# Patient Record
Sex: MALE | Race: WHITE
[De-identification: names, ages, dates, MRNs, and addresses within clinical notes are randomized per-mention and may not be internally consistent; named-entity substitution may affect disease eponyms.]

---

## 2018-01-11 ENCOUNTER — HOSPITAL ENCOUNTER (EMERGENCY)
Dept: HOSPITAL 74 - FER | Age: 78
Discharge: HOME | End: 2018-01-11
Payer: COMMERCIAL

## 2018-01-11 VITALS — BODY MASS INDEX: 25.4 KG/M2

## 2018-01-11 VITALS — DIASTOLIC BLOOD PRESSURE: 68 MMHG | TEMPERATURE: 98.2 F | SYSTOLIC BLOOD PRESSURE: 170 MMHG | HEART RATE: 73 BPM

## 2018-01-11 DIAGNOSIS — Z87.891: ICD-10-CM

## 2018-01-11 DIAGNOSIS — S00.83XA: Primary | ICD-10-CM

## 2018-01-11 DIAGNOSIS — Z85.51: ICD-10-CM
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
## 2018-01-11 DIAGNOSIS — I10: ICD-10-CM
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
## 2018-01-11 DIAGNOSIS — S00.81XA: ICD-10-CM

## 2018-01-11 PROCEDURE — 3E0234Z INTRODUCTION OF SERUM, TOXOID AND VACCINE INTO MUSCLE, PERCUTANEOUS APPROACH: ICD-10-PCS

## 2018-01-11 NOTE — PDOC
History of Present Illness





- General


Stated Complaint: FELL, STRUCK HEAD


Time Seen by Provider: 01/11/18 21:06


History Source: Patient


Exam Limitations: No Limitations





- History of Present Illness


Initial Comments: 





01/11/18 21:38


This is a 77-year-old male who was ambulating outside when he tripped on a 

uneven sidewalk and fell striking his right forehead area on the ground. 

Patient did not pass out. Patient denies any headache, nausea, neck pain or any 

other injuries. Patient denies any weakness or dizziness, chest pain or 

difficulty concentrating. Patient is not on any blood thinners.





PAST MEDICAL HISTORY:  no significant history





PAST SURGICAL HISTORY:  no significant history





FAMILY HISTORY:  no pertinant history





SOCIAL HISTORY:  Pt lives with  family and is employed.





MEDICATIONS:  reviewed





ALLERGIES:  As per nursing notes





Review of Systems


General:  No fevers or chills, no weakness, no weight loss 


HEENT: No change in vision.  No sore throat,. No ear pain


CardioVascular:  No chest pain or shortness of breath


Respiratory:No cough, or wheezing. 


Gastrointestinal:  no nausea, vomitting, diarrhea or constipation,  No rectal 

bleeding


Genitourinary:  No dysuria, hematuria, or frequency


Musculoskeletal:  No joint or muscle pain or swelling


Neurologic: No headache, vertigo, dizziness or loss of consciousness


Psychiatric: nor depression 


Skin: No rashes or easy bruising


Endocrine: no increased thirst or abnormal weight change


Allergic: no skin or latex allergy


All other systems reviewed and normal








GENERAL: The patient is awake, alert, and fully oriented, in no acute distress.


HEAD: There is a contusion and abrasion over the right eyebrow area there is no 

active bleeding there is no bony tenderness of the orbits


Cervical spine: There is non-tenderness on palpation of the cervical spine 

diffusely there is full range of motion without pain


EYES: Pupils equal, round and reactive to light, extraocular movements intact, 

sclera anicteric, conjunctiva clear.


EXTREMITIES: Normal range of motion, no edema.


NEUROLOGICAL: Normal speech, normal gait. grossly intact 


PSYCH: Normal mood, normal affect.


SKIN: Warm, Dry, normal turgor, no rashes or lesions noted.





01/11/18 22:14


Head CT negative for any acute intercranial pathology





Assessment and plan: This is a 77-year-old male who had a mechanical trip and 

fall and hit his head. Patient was updated with his tetanus shot and head CT 

was done which was negative. Patient's family given head injury discharge 

instructions and patient discharged home.





Past History





- Past Medical History


Allergies/Adverse Reactions: 


 Allergies











Allergy/AdvReac Type Severity Reaction Status Date / Time


 


Penicillins Allergy   Verified 01/11/18 21:06


 


tetracycline [Tetracycline] Allergy   Verified 01/11/18 21:06











Home Medications: 


Ambulatory Orders





Hydrochlorothiazide [Hctz] 12.5 mg PO DAILY 02/09/13 


Imipramine HCl [Tofranil] 50 mg PO HS 02/09/13 


Alprazolam [Xanax] 1 mg PO HS 01/11/18 


Finasteride 5 mg PO DAILY 01/11/18 


Metoprolol Succinate [Toprol Xl] 25 mg PO DAILY 01/11/18 


Tamsulosin HCl [Flomax] 0.4 mg PO DAILY 01/11/18 








Cancer: Yes (BLADDER)


HTN: Yes





- Surgical History


Cholecystectomy: Yes





- Suicide/Smoking/Psychosocial Hx


Smoking Status: No


Smoking History: Former smoker


Number of Cigarettes Smoked Daily: 0





*DC/Admit/Observation/Transfer


Diagnosis at time of Disposition: 


Forehead contusion


Qualifiers:


 Encounter type: initial encounter Qualified Code(s): S00.83XA - Contusion of 

other part of head, initial encounter





Abrasion of forehead


Qualifiers:


 Encounter type: initial encounter Qualified Code(s): S00.81XA - Abrasion of 

other part of head, initial encounter








- Discharge Dispostion


Disposition: HOME


Condition at time of disposition: Good


Admit: No





- Referrals


Referrals: 


Ld Liu [Primary Care Provider] - 





- Patient Instructions


Additional Instructions: 








 Someone should check on you once tonight during the night. 





You should be arousable to your Normal level of arousability for that time of 

the night.





If you have been vomiting, have had a seizure, or you are unable to be aroused 

or the person checking on you is concerned that there has been a change in your 

mental status they should call 911 and have you brought back to the emergency 

department.





You can take Tylenol as needed for pain.





Return to the emergency department immediately with ANY new, persistent or 

worsening symptoms.





Continue any medications as previously prescribed by your physician.





You should follow up with your primary doctor as soon as possible regarding 

today's emergency department visit.


.


Please make sure your doctor reviews the results of your emergency evaluation.





Thank you for coming to the   Emergency Department today for your care. It was 

a pleasure to see you today. Please note that your evaluation is INCOMPLETE 

until you  follow-up with your doctor. 











- Post Discharge Activity

## 2019-07-10 ENCOUNTER — HOSPITAL ENCOUNTER (INPATIENT)
Dept: HOSPITAL 74 - FM/S | Age: 79
LOS: 2 days | Discharge: HOME | DRG: 470 | End: 2019-07-12
Attending: INTERNAL MEDICINE | Admitting: ORTHOPAEDIC SURGERY
Payer: COMMERCIAL

## 2019-07-10 VITALS — BODY MASS INDEX: 26.7 KG/M2

## 2019-07-10 DIAGNOSIS — F41.9: ICD-10-CM

## 2019-07-10 DIAGNOSIS — D50.9: ICD-10-CM

## 2019-07-10 DIAGNOSIS — I10: ICD-10-CM

## 2019-07-10 DIAGNOSIS — D72.829: ICD-10-CM

## 2019-07-10 DIAGNOSIS — M16.11: Primary | ICD-10-CM

## 2019-07-10 PROCEDURE — 0SR903A REPLACEMENT OF RIGHT HIP JOINT WITH CERAMIC SYNTHETIC SUBSTITUTE, UNCEMENTED, OPEN APPROACH: ICD-10-PCS | Performed by: ORTHOPAEDIC SURGERY

## 2019-07-10 RX ADMIN — ACETAMINOPHEN SCH MG: 325 TABLET ORAL at 11:50

## 2019-07-10 RX ADMIN — FERROUS SULFATE TAB EC 324 MG (65 MG FE EQUIVALENT) SCH MG: 324 (65 FE) TABLET DELAYED RESPONSE at 21:14

## 2019-07-10 RX ADMIN — ACETAMINOPHEN SCH MG: 325 TABLET ORAL at 23:13

## 2019-07-10 RX ADMIN — OXYCODONE HYDROCHLORIDE AND ACETAMINOPHEN SCH MG: 500 TABLET ORAL at 21:14

## 2019-07-10 RX ADMIN — CEFAZOLIN SODIUM SCH GM: 2 SOLUTION INTRAVENOUS at 17:04

## 2019-07-10 RX ADMIN — ASPIRIN 81 MG SCH MG: 81 TABLET ORAL at 21:14

## 2019-07-10 RX ADMIN — ACETAMINOPHEN SCH MG: 325 TABLET ORAL at 17:03

## 2019-07-10 RX ADMIN — IMIPRAMINE HYDROCHLORIDE SCH MG: 25 TABLET ORAL at 21:15

## 2019-07-10 RX ADMIN — DOCUSATE SODIUM,SENNOSIDES SCH TABLET: 50; 8.6 TABLET, FILM COATED ORAL at 21:14

## 2019-07-10 NOTE — HP
Admitting History and Physical





- Admission


Chief Complaint: right hip pain


History of Present Illness: 





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 

been getting worse over time.


denies chest pain, palpitations, nausea, vomiting, diarrhea


History Source: Patient


Limitations to Obtaining History: No Limitations





- Past Medical History


Cardiovascular: Yes: HTN


Psych: Yes: Anxiety





- Advance Directives


Advance Directives: Yes: Health Care Proxy





- Smoking History


Smoking history: Never smoked


Have you smoked in the past 12 months: No


Aproximately how many cigarettes per day: 0


If you are a former smoker, when did you quit?: 1992





- Alcohol/Substance Use


Hx Alcohol Use: Yes (wine with dinner)





Home Medications





- Allergies


Allergies/Adverse Reactions: 


 Allergies











Allergy/AdvReac Type Severity Reaction Status Date / Time


 


Penicillins Allergy   Verified 01/11/18 21:06


 


tetracycline [Tetracycline] Allergy   Verified 01/11/18 21:06














- Home Medications


Home Medications: 


Ambulatory Orders





Hydrochlorothiazide [Hctz] 25 mg PO DAILY 02/09/13 


Imipramine HCl [Tofranil] 50 mg PO HS 02/09/13 


Alprazolam [Xanax] 0.5 mg PO HS 01/11/18 


Metoprolol Succinate [Toprol Xl] 25 mg PO DAILY 01/11/18 


Glucos Sul 2Kcl/MSM/Chond/C/Mn [Glucosamine Chondroitin Cap] 1 each PO DAILY 07/ 02/19 











Review of Systems





- Review of Systems


Constitutional: reports: No Symptoms


Eyes: reports: No Symptoms


HENT: reports: No Symptoms


Neck: reports: No Symptoms


Cardiovascular: reports: No Symptoms


Respiratory: reports: No Symptoms


Gastrointestinal: reports: No Symptoms


Genitourinary: reports: No Symptoms


Musculoskeletal: reports: Joint Pain


Integumentary: reports: No Symptoms


Endocrine: reports: No Symptoms


Hematology/Lymphatic: reports: No Symptoms


Psychiatric: reports: Anxiety


Pain Intensity: 7





Physical Examination


Vital Signs: 


 Vital Signs











Temperature  97.5 F L  07/10/19 11:58


 


Pulse Rate  58 L  07/10/19 11:58


 


Respiratory Rate  18   07/10/19 11:58


 


Blood Pressure  125/56 L  07/10/19 11:58


 


O2 Sat by Pulse Oximetry (%)  99   07/10/19 11:58











Constitutional: Yes: Well Nourished, Anxious


Eyes: Yes: WNL


HENT: Yes: WNL


Neck: Yes: WNL


Cardiovascular: Yes: WNL


Respiratory: Yes: WNL


Renal/: Yes: WNL


Musculoskeletal: Yes: Joint Stiffness


Extremities: Yes: WNL


Integumentary: Yes: WNL


Wound/Incision: Yes: Clean/Dry, Dressing Dry and Intact


Neurological: Yes: WNL


...Motor Strength: WNL


Psychiatric: Yes: Alert, Oriented





Assessment/Plan


80 yo man with  Right hip DJD S/P Right total hip replacement POD#0.


cont pain manageemnt. incentive spirometry.


-GI, DVT prophylaxis.


-ID: Ancef given empirically


-HTN: on toprol XL, HCTZ


-anxiety: cont xanax


-cont stool softeners to avoid opioid induced constipation.


-chronic iron deficiency anemia: on ferrous sulfate. Pt understands this may 

exacerbate his constipation.


-AM labs ordered


-PT/OT/OOB as tolerated


-assessment and plan discussed with pt and family at bedside.

## 2019-07-10 NOTE — PN
Progress Note (short form)





- Note


Progress Note: 





79M s/p RIGHT total hip replacement POD #0.





-Pain control: per anaesthesia team.


-DVT PPx:


   -Chemical: ASA 81mg PO BID x 6 weeks.


   -Mechanical: CEE's, SCD's.


-Incentive spirometry q15 min.


-PT/OT/Rehab, OOB.


-WBAT RLE.


-Post-op Ancef x 2 doses.


-f/u post-op TOV: 8 hours max.


-f/u AM labs.


-Diet as tolerated.


-Care per medical hospitalist: Dr. Venegas.


-Discharge planning: f/u [facility] [place] [facility] Thursday 7/18/2019; 

call for appointment [phone].


-Will follow.





Chaitanya Monique MD (Orthopaedic Surgery).

## 2019-07-10 NOTE — OP
Operative Note





- Note:


Operative Date: 07/10/19


Pre-Operative Diagnosis: Right hip DJD


Operation: Right total hip replacement


Implants: [place].  Cup - Trident II-Tritanium 54mm.  Poly - 36mm, neutral.  

Stem - Accolade II, 127 deg NSA, #4.  Head - 36mm diameter, standard length, 

Biolox/Delta Ceramic


Post-Operative Diagnosis: Same as Pre-op


Surgeon: Chaitanya Monique


Assistant: Darian Monique


Anesthesiologist/CRNA: Karl Rojo


Anesthesia: Spinal


Specimens Removed: Right femoral head


Estimated Blood Loss (mls): 100


Fluid Volume Replaced (mls): 1,100 (Crystalloid)


Operative Report Dictated: Yes

## 2019-07-10 NOTE — HP
History & Physical Update





- History


History: No Change





- Physical


Physical: No Change





- Assessment


Assessment: No Change





- Plan


Plan: No Change (no changes since mila on 6/17/19.)

## 2019-07-11 LAB
ANION GAP SERPL CALC-SCNC: 8 MMOL/L (ref 8–16)
BUN SERPL-MCNC: 19 MG/DL (ref 7–18)
CALCIUM SERPL-MCNC: 8.7 MG/DL (ref 8.5–10)
CHLORIDE SERPL-SCNC: 96 MMOL/L (ref 98–107)
CO2 SERPL-SCNC: 31 MMOL/L (ref 21–32)
CREAT SERPL-MCNC: 1 MG/DL (ref 0.55–1.3)
DEPRECATED RDW RBC AUTO: 13.5 % (ref 11.9–15.9)
GLUCOSE SERPL-MCNC: 106 MG/DL (ref 74–106)
HCT VFR BLD CALC: 35.3 % (ref 35.4–49)
HGB BLD-MCNC: 12.2 GM/DL (ref 11.7–16.9)
MCH RBC QN AUTO: 32.5 PG (ref 25.7–33.7)
MCHC RBC AUTO-ENTMCNC: 34.4 G/DL (ref 32–35.9)
MCV RBC: 94.3 FL (ref 80–96)
PLATELET # BLD AUTO: 271 K/MM3 (ref 134–434)
PMV BLD: 8.6 FL (ref 7.5–11.1)
POTASSIUM SERPLBLD-SCNC: 4 MMOL/L (ref 3.5–5.1)
RBC # BLD AUTO: 3.75 M/MM3 (ref 4–5.6)
SODIUM SERPL-SCNC: 135 MMOL/L (ref 136–145)
WBC # BLD AUTO: 15.7 K/MM3 (ref 4–10.8)

## 2019-07-11 RX ADMIN — ASPIRIN 81 MG SCH MG: 81 TABLET ORAL at 21:05

## 2019-07-11 RX ADMIN — FERROUS SULFATE TAB EC 324 MG (65 MG FE EQUIVALENT) SCH MG: 324 (65 FE) TABLET DELAYED RESPONSE at 21:05

## 2019-07-11 RX ADMIN — OXYCODONE HYDROCHLORIDE AND ACETAMINOPHEN SCH MG: 500 TABLET ORAL at 21:05

## 2019-07-11 RX ADMIN — FERROUS SULFATE TAB EC 324 MG (65 MG FE EQUIVALENT) SCH MG: 324 (65 FE) TABLET DELAYED RESPONSE at 10:26

## 2019-07-11 RX ADMIN — DOCUSATE SODIUM,SENNOSIDES SCH TABLET: 50; 8.6 TABLET, FILM COATED ORAL at 21:06

## 2019-07-11 RX ADMIN — PANTOPRAZOLE SODIUM SCH MG: 40 TABLET, DELAYED RELEASE ORAL at 10:27

## 2019-07-11 RX ADMIN — DOCUSATE SODIUM,SENNOSIDES SCH TABLET: 50; 8.6 TABLET, FILM COATED ORAL at 10:27

## 2019-07-11 RX ADMIN — CEFAZOLIN SODIUM SCH GM: 2 SOLUTION INTRAVENOUS at 01:00

## 2019-07-11 RX ADMIN — ASPIRIN 81 MG SCH MG: 81 TABLET ORAL at 10:26

## 2019-07-11 RX ADMIN — ACETAMINOPHEN SCH MG: 325 TABLET ORAL at 04:55

## 2019-07-11 RX ADMIN — IMIPRAMINE HYDROCHLORIDE SCH MG: 25 TABLET ORAL at 21:06

## 2019-07-11 RX ADMIN — ACETAMINOPHEN SCH MG: 325 TABLET ORAL at 11:31

## 2019-07-11 RX ADMIN — OXYCODONE HYDROCHLORIDE AND ACETAMINOPHEN SCH MG: 500 TABLET ORAL at 10:27

## 2019-07-11 RX ADMIN — HYDROCHLOROTHIAZIDE SCH MG: 25 TABLET ORAL at 10:26

## 2019-07-11 NOTE — OP
DATE OF OPERATION:  07/10/2019

 

PREOPERATIVE DIAGNOSIS:  Right hip degenerative joint disease.

 

POSTOPERATIVE DIAGNOSIS:  Right hip degenerative joint disease.

 

PROCEDURE PERFORMED:  Right total hip replacement via a direct superior approach.  

 

SURGEON:  Chaitanya Monique MD 

 

ASSISTANTS:  Darian Monique MD, and ERIC Atkinson

 

ANESTHESIOLOGIST:  Karl Rojo MD

 

ANESTHESIA:  Spinal and sedation.  

 

ESTIMATED BLOOD LOSS:  100 mL.

 

FLUID VOLUME REPLACEE:  Crystalloid 1.1 L. 

 

 

Chaitanya Monique MD

 

[id number]

DD: 07/10/2019 11:09

DT: 07/10/2019 15:12

Job #:  [id number]

## 2019-07-11 NOTE — PN
Progress Note, Physician


Chief Complaint: 





right hip tenderness





- Current Medication List


Current Medications: 


Active Medications





Acetaminophen (Tylenol -)  650 mg PO Q6H [facility]


   Stop: 07/13/19 11:29


   Last Admin: 07/11/19 11:31 Dose:  650 mg


Al Hydroxide/Mg Hydroxide ([place]ylanta Oral Suspension -)  30 ml PO Q4H PRN


   PRN Reason: DYSPEPSIA


Alprazolam (Xanax -)  0.5 mg PO HS [facility]


   Last Admin: 07/10/19 21:14 Dose:  0.5 mg


Ascorbic Acid (Vitamin C -)  500 mg PO BID [facility]


   Last Admin: 07/11/19 10:27 Dose:  500 mg


Aspirin (Asa -)  81 mg PO BID [facility]


   Last Admin: 07/11/19 10:26 Dose:  81 mg


Ferrous Sulfate (Feosol -)  325 mg PO BID [facility]


   Last Admin: 07/11/19 10:26 Dose:  325 mg


Hydrochlorothiazide (Hctz -)  25 mg PO DAILY [facility]


   Last Admin: 07/11/19 10:26 Dose:  25 mg


Imipramine HCl (Tofranil -)  50 mg PO [facility]


   Last Admin: 07/10/19 21:15 Dose:  50 mg


Magnesium Hydroxide (Milk Of Magnesia -)  30 ml PO PRN PRN


   PRN Reason: CONSTIPATION


Metoprolol Succinate (Toprol Xl -)  25 mg PO DAILY [facility]


   Last Admin: 07/11/19 10:27 Dose:  25 mg


Ondansetron HCl (Zofran Injection)  4 mg IVPUSH Q6H PRN


   PRN Reason: NAUSEA


Oxycodone HCl (Roxicodone -)  5 mg PO Q3H PRN


   PRN Reason: PAIN LEVEL 1-5


   Last Admin: 07/11/19 04:55 Dose:  5 mg


Oxycodone HCl (Roxicodone -)  10 mg PO Q3H PRN


   PRN Reason: PAIN LEVEL 6-10


   Last Admin: 07/11/19 12:47 Dose:  10 mg


Pantoprazole Sodium (Protonix -)  40 mg PO DAILY [facility]


   Last Admin: 07/11/19 10:27 Dose:  40 mg


Senna/Docusate Sodium (Pericolace -)  2 tablet PO BID [facility]


   Last Admin: 07/11/19 10:27 Dose:  2 tablet











- Objective


Vital Signs: 


 Vital Signs











Temperature  98.6 F   07/11/19 15:11


 


Pulse Rate  81   07/11/19 15:11


 


Respiratory Rate  20   07/11/19 15:11


 


Blood Pressure  138/53 L  07/11/19 15:11


 


O2 Sat by Pulse Oximetry (%)  100   07/11/19 15:11











Constitutional: Yes: Well Nourished, No Distress


Eyes: Yes: WNL


HENT: Yes: WNL


Neck: Yes: WNL


Cardiovascular: Yes: WNL


Respiratory: Yes: WNL


Gastrointestinal: Yes: WNL


Genitourinary: Yes: WNL


Musculoskeletal: Yes: Joint Stiffness


Extremities: Yes: WNL


Edema: No


Peripheral Pulses WNL: Yes


Integumentary: Yes: WNL


Wound/Incision: Yes: Clean/Dry, Well Approximated, Dressing Dry and Intact


Neurological: Yes: WNL


...Motor Strength: WNL


Psychiatric: Yes: WNL


Labs: 


 CBC, BMP





 07/11/19 07:05 





 07/11/19 07:05 











Assessment/Plan


78 yo man with  Right hip DJD S/P Right total hip replacement POD#1.


cont pain manageemnt. incentive spirometry.


-GI, DVT prophylaxis.


-ID: Ancef given empirically


-HTN: on toprol XL, HCTZ


-anxiety: cont xanax


-cont stool softeners to avoid opioid induced constipation.


-chronic iron deficiency anemia: on ferrous sulfate. Pt understands this may 

exacerbate his constipation.


-post-op leucocytosis: reactive. will monitor.


-PT/OT/OOB as tolerated


-assessment and plan discussed with pt and family at bedside.

## 2019-07-11 NOTE — PN
Progress Note (short form)





- Note


Progress Note: 





S: Pt. sitting in chair. Ambulating ok.


O: VAS 2/10


A/P: POD #1 s/p Right THR


1. Continue pain meds as ordered


2. No anesthetic complications

## 2019-07-11 NOTE — SPA.POSTOP
- POST-OP NOTE





POD #1 s/p R total hip replacement








Patient resting comfortably. Pain management via prn meds.


Denies n/v/f/c, CP or SOB.





 Last Vital Signs











Temp Pulse Resp BP Pulse Ox


 


 98.7 F   76   18   121/49 L  97 


 


 07/11/19 06:00  07/11/19 06:00  07/11/19 06:00  07/11/19 06:00  07/11/19 06:00











Physical Exam


General: No acute distress.


Pulm: Clear to auscultation bilat anteriorly


Cor: Regular rhythm


Abd: Soft. Non-tender. No distention


LE: Soft, non-tender bilat. SCD's bilat. 5/5 dorsi/plantar flexion/EHL.


Right Hip: dressing c/d/i





 CBC, BMP





 07/11/19 07:05 





 07/11/19 07:05 








A/P: 80 yo male s/p R total hip replacement


   OOB and ambulate with PT/assistance


   Diet as tolerated


   Oral pain medication for pain management


   DVT ppx with SCDs, ambulation, aspirin 81 mg bid


   Repeat CBC in the am


   D/w Dr. Monique

## 2019-07-12 VITALS — TEMPERATURE: 98.4 F | DIASTOLIC BLOOD PRESSURE: 56 MMHG | SYSTOLIC BLOOD PRESSURE: 134 MMHG

## 2019-07-12 VITALS — HEART RATE: 76 BPM

## 2019-07-12 LAB
DEPRECATED RDW RBC AUTO: 13.7 % (ref 11.9–15.9)
HCT VFR BLD CALC: 37 % (ref 35.4–49)
HGB BLD-MCNC: 12.5 GM/DL (ref 11.7–16.9)
MCH RBC QN AUTO: 32.2 PG (ref 25.7–33.7)
MCHC RBC AUTO-ENTMCNC: 33.8 G/DL (ref 32–35.9)
MCV RBC: 95.3 FL (ref 80–96)
PLATELET # BLD AUTO: 292 K/MM3 (ref 134–434)
PMV BLD: 8.6 FL (ref 7.5–11.1)
RBC # BLD AUTO: 3.88 M/MM3 (ref 4–5.6)
WBC # BLD AUTO: 17.1 K/MM3 (ref 4–10.8)

## 2019-07-12 RX ADMIN — PANTOPRAZOLE SODIUM SCH MG: 40 TABLET, DELAYED RELEASE ORAL at 09:17

## 2019-07-12 RX ADMIN — ACETAMINOPHEN SCH MG: 325 TABLET ORAL at 05:27

## 2019-07-12 RX ADMIN — HYDROCHLOROTHIAZIDE SCH MG: 25 TABLET ORAL at 09:17

## 2019-07-12 RX ADMIN — ASPIRIN 81 MG SCH MG: 81 TABLET ORAL at 09:17

## 2019-07-12 RX ADMIN — OXYCODONE HYDROCHLORIDE AND ACETAMINOPHEN SCH MG: 500 TABLET ORAL at 09:17

## 2019-07-12 RX ADMIN — ACETAMINOPHEN SCH MG: 325 TABLET ORAL at 00:13

## 2019-07-12 RX ADMIN — FERROUS SULFATE TAB EC 324 MG (65 MG FE EQUIVALENT) SCH MG: 324 (65 FE) TABLET DELAYED RESPONSE at 09:17

## 2019-07-12 RX ADMIN — DOCUSATE SODIUM,SENNOSIDES SCH TABLET: 50; 8.6 TABLET, FILM COATED ORAL at 09:17

## 2019-07-12 RX ADMIN — ACETAMINOPHEN SCH MG: 325 TABLET ORAL at 13:18

## 2019-07-12 NOTE — DS
Physical Examination


Vital Signs: 


 Vital Signs











Temperature  98.4 F   07/12/19 06:00


 


Pulse Rate  76   07/12/19 06:00


 


Respiratory Rate  18   07/12/19 08:15


 


Blood Pressure  134/56 L  07/12/19 06:00


 


O2 Sat by Pulse Oximetry (%)  97   07/12/19 08:15











Constitutional: Yes: Well Nourished


Eyes: Yes: WNL


HENT: Yes: WNL


Neck: Yes: WNL


Cardiovascular: Yes: WNL


Respiratory: Yes: WNL


Gastrointestinal: Yes: WNL


Renal/: Yes: WNL


Musculoskeletal: Yes: Joint Stiffness


Extremities: Yes: WNL


Edema: No


Peripheral Pulses WNL: Yes


Integumentary: Yes: WNL


Wound/Incision: Yes: Clean/Dry, Well Approximated, Dressing Dry and Intact


Neurological: Yes: WNL


...Motor Strength: WNL


Labs: 


 CBC, BMP





 07/12/19 07:21 





 07/11/19 07:05 











Discharge Summary


Reason For Visit: RIGHT HIP OSTEOARTHRITIS


Hospital Course: 





80 yo man with  Right hip DJD S/P Right total hip replacement POD#2.


cont pain manageemnt. incentive spirometry.


-GI, DVT prophylaxis.


-ID: Ancef given empirically


-HTN: on toprol XL, HCTZ


-post-op leucocytosis. afebrile. to monitor outpatient.


-anxiety: cont xanax


-cont stool softeners to avoid opioid induced constipation.


-chronic iron deficiency anemia: on ferrous sulfate. Pt understands this may 

exacerbate his constipation.


-post-op leucocytosis: reactive. will monitor.


-PT/OT/OOB as tolerated


-assessment and plan discussed with pt and family at bedside.


Condition: Good





- Instructions


Diet, Activity, Other Instructions: 


Dr. Monique Discharge Instructions for Hip Replacement





Post Operative Instructions





Physical activity


Physical Therapist will come to your home for the first 5 days. You will be set 

up with outpatient PT at your first post-operative visit. Use assistive devices 

for ambulation at all times. Weight bearing as tolerated on your surgical side. 





Wound care


Leave your surgical dressing in place. Do not change the dressing until seen by 

your surgeon in the office. No baths or showers. Do not submerge your incision. 

Do not apply any ointments or lotions to your incision. Please call the office 

if your dressing is soiled/dirty or is falling off. Apply Graduated Compression 

Stockings (TEDS) to both lower extremities-remove daily for hygiene ONLY. 





Diet


There are no dietary restrictions. Eat healthy, high-fiber foods. Drink 6 to 8 

glasses of liquid each day. This will assist in keeping your bowels are regular.





Pain management


Any pain prescription medication ordered should be taken as prescribed for 

moderate to severe pain. Do not take additional Tylenol while taking Percocet.





Posterior Hip Precautions:


Do not cross the leg you had surgery on over your other leg. (Do not cross your 

legs.)Use an elevated toilet seat. Do not sit on low chairs or beds. Use purple 

pillow (abductor) when lying in bed.





Take Aspirin 81 mg two times a day for a total of 6 weeks to prevent blood 

clots.





Call Dr. Monique for any of the following:


Severe pain not relieved by medication


Fever of 101 or higher


Excessive bleeding or drainage on dressing


Inability to urinate


If you experience chest pain or shortness of breath, please seek emergency care 

immediately.





Please call the office at [phone] to confirm your post-op appointment 

for the week following surgery.





[facility] checked [phone]


Disposition: HOME





- Home Medications


Comprehensive Discharge Medication List: 


Ambulatory Orders





Hydrochlorothiazide [Hctz -] 25 mg PO DAILY 02/09/13 


Imipramine HCl [Tofranil -] 50 mg PO HS 02/09/13 


Alprazolam [Xanax] 0.5 mg PO HS 01/11/18 


Metoprolol Succinate [Toprol Xl] 25 mg PO DAILY 01/11/18 


Glucos Sul 2Kcl/MSM/Chond/C/Mn [Glucosamine Chondroitin Cap] 1 each PO DAILY 07/ 02/19 


Acetaminophen [Tylenol .Regular Strength -] 1,000 mg PO Q8H  tablet 07/12/19 


Aspirin [ASA -] 81 mg PO BID #90 tab.chew 07/12/19 


Oxycodone HCl 5 mg PO Q6H PRN #30 tablet MDD 6 07/12/19

## 2019-07-12 NOTE — SPA.POSTOP
- POST-OP NOTE





POD #2 s/p R THR








Patient resting comfortably. Pain management via prn meds. Passing flatus, no 

BM.


Denies n/v/f/c, CP or SOB. Passing flatus, no BM's.





 Last Vital Signs











Temp Pulse Resp BP Pulse Ox


 


 98.4 F   76   18   134/56 L  97 


 


 07/12/19 06:00  07/12/19 06:00  07/12/19 08:15  07/12/19 06:00  07/12/19 08:15











Physical Exam


General: No acute distress.


Pulm: Clear to auscultation bilat anteriorly


Cor: Regular rhythm


Abd: Soft. Non-tender. slight distended, non-tender.


LE: Soft, non-tender bilat. SCD's bilat. 


Right hip Dressing c/d/i. No ecchymosis. Hip soft.





 CBC, BMP





 07/12/19 07:21 





 07/11/19 07:05 





A/p: 80 yo male s/p total Right hip replacement


   Doing well surgically, awaiting discharge to home with services.


   Pain management post-op discussed with the patient, tylenol and narcotics as 

needed. Stool softners as needed


   Follow-up with Dr. Monique next week, leave surgical dressing in place. No 

showers or baths.


   D/w Dr. Monique

## 2019-07-12 NOTE — PATH
Surgical Pathology Report



Patient Name:  LIZ BUSBY

Accession #:  [id number]

Med. Rec. #:  [id number]                                                        

   /Age/Gender:  1940 (Age: 79) / M

Account:  [id number]                                                          

             Location: [facility] MED-SURG

Taken:  7/10/2019

Received:  7/10/2019

Reported:  2019

Physicians:  Chaitanya Monique M.D.

  



Specimen(s) Received

 RIGHT FEMORAL HEAD 





Clinical History

Right hip osteoarthritis







Final Diagnosis

FEMORAL HEAD, RIGHT, TOTAL HIP REPLACEMENT:

DEGENERATIVE JOINT DISEASE.





***Electronically Signed***

Donna Hartman M.D.





Gross Description

Received in formalin, labeled "right femoral head," is a 4.8 x 4.8 x 4.3 cm.

femoral head with 1.5 cm in length portion of femoral neck attached. The margin

of resection is smooth. There is a 2.5 cm in greatest dimension area of

eburnation present. The remaining articular surface is tan-yellow and diffusely

granular and nodular. The underlying trabecular bone is yellow and hard. A

representative section is submitted in one cassette, following decalcification. 



/2019



[facility]